# Patient Record
Sex: FEMALE | Race: BLACK OR AFRICAN AMERICAN | Employment: OTHER | URBAN - METROPOLITAN AREA
[De-identification: names, ages, dates, MRNs, and addresses within clinical notes are randomized per-mention and may not be internally consistent; named-entity substitution may affect disease eponyms.]

---

## 2017-06-26 ENCOUNTER — HOSPITAL ENCOUNTER (OUTPATIENT)
Dept: PHYSICAL THERAPY | Age: 52
Discharge: HOME | End: 2017-06-26
Payer: OTHER GOVERNMENT

## 2017-06-26 PROCEDURE — 97110 THERAPEUTIC EXERCISES: CPT

## 2017-06-26 PROCEDURE — 97161 PT EVAL LOW COMPLEX 20 MIN: CPT

## 2017-06-26 NOTE — PROGRESS NOTES
Phoebe Tre[address]izo [address]  [facility]  [facility]  Sree Mcguire [facility] [address], [address] [address] ,[address], [address]  Phone: [phone]  Fax: [phone] / [address] [address] PHYSICAL THERAPY SERVICES  Patient Name: Janice Mandujano : 1965   Medical   Diagnosis: Bilateral plantar fasciitis [M72.2]  Left tibialis posterior tendonitis [Z15.281] Treatment Diagnosis: L>R plantar fasciitis    Onset Date: May 2017     Referral Source: Gabriel Montaño DPM Start of Care [facility]): 2017   Prior Hospitalization: See medical history Provider #: [id number]   Prior Level of Function: Manageable sx with ADLs & fitness routines   Comorbidities: Previous h/o L ankle surgery, removal of bone sput   Medications: Verified on Patient Summary List   The Plan of Care and following information is based on the information from the initial evaluation.   ==================================================================================  Assessment / key information:  Patient is a 45 y.o. female who presents to [facility] with Dx of L>R plantar fasciitis. Patient reports initial onset of sx were chronic in nature with worsening of since this past May of 2017 when she increased activity levels when she started to exercise this past May. She was running, taking indoor cycling & weight training which she has since discontinued on 17 per recommendation of MD at last f/u. She is doing yoga 3 days/week. Patient reports sx are intermittent in nature with worsening of sx with increased activity levels/weightbearing. Sx improve with rest.  Average reported pain level at 3-4/10, 5/10 at worst & 0/10 at best. Upon objective evaluation patient demonstrates decreased gastroc flexibility with DF to neural bilaterally, WFL B ankle strength, no c/o pain upon of MMT of PF. Mild TTP along medial scar, along navicular.   Discussed & reviewed shoewear with patient, she is currently in an [employer] stability shoe, 10 mm heel toe offset, she was issued orthotics from MD but has not placed them in her shoes yet. Patient can benefit from PT interventions to improve strength, decrease pain, improve flexibility & balance to facilitate ADLs & overall functional status.  ===============================================================================  Eval Complexity: History MEDIUM  Complexity : 1-2 comorbidities / personal factors will impact the outcome/ POC ;  Examination  MEDIUM Complexity : 3 Standardized tests and measures addressing body structure, function, activity limitation and / or participation in recreation ; Presentation LOW Complexity : Stable, uncomplicated ;  Decision Making MEDIUM Complexity : FOTO score of 26-74; Overall Complexity LOW   Problem List: pain affecting function, decrease ROM, decrease strength, edema affecting function, impaired gait/ balance, decrease ADL/ functional abilitiies, decrease activity tolerance, decrease flexibility/ joint mobility and decrease transfer abilities   Treatment Plan may include any combination of the following: Therapeutic exercise, Therapeutic activities, Neuromuscular re-education, Physical agent/modality, Gait/balance training, Manual therapy, Aquatic therapy, Patient education, Self Care training, Functional mobility training, Home safety training, Stair training and Other: orthotic evaluation/fitting prn  Patient / Family readiness to learn indicated by: asking questions, trying to perform skills and interest  Persons(s) to be included in education: patient (P)  Barriers to Learning/Limitations: None  Measures taken:    Patient Goal (s): \"relieve some pain & continue to exercise without pain\"   Patient self reported health status: excellent  Rehabilitation Potential: excellent   Short Term Goals: To be accomplished in  2  weeks:  1) Establish HEP to prevent further disability.     2) Patient will report decreased c/o pain to < or = 2/10 to facilitate prolonged ambulation  with manageable sx in L ankle. [place] Halt 3) Patient will be able to participate in yoga classes 3xs/week with no c/o pain afterwards with use of daily ice, stretching, & postural modifications.  Long Term Goals: To be accomplished in  4  weeks:  1) Improve FOTO score from 64 points to > or = 78 points indicating improved tolerance with ADLs in regards to L ankle. 2) Patient to report 75% improvement in overall function in preparation for return to recreational activities with manageable sx in L ankle. 3) Patient to resume recreational fitness program 2-3xs/week with no increase in L ankle pain. 4) Patient to be independent & compliant with HEP in preparation for D/C. Frequency / Duration:   Patient to be seen  2-3  times per week for 4  weeks:  Patient / Caregiver education and instruction: self care, activity modification, brace/ splint application and exercises  G-Codes (GP): NA  Therapist Signature: SHAMIKA Velasquez cert MDT Date: 5/87/2816   Certification Period: NA Time: 9:11 AM   ===========================================================================================  I certify that the above Physical Therapy Services are being furnished while the patient is under my care. I agree with the treatment plan and certify that this therapy is necessary. Physician Signature:        Date:       Time:     Please sign and return to [facility] or you may fax the signed copy to [phone]. Thank you.

## 2017-06-26 NOTE — PROGRESS NOTES
PHYSICAL THERAPY - DAILY TREATMENT NOTE    Patient Name: Jaylon Harper        Date: 2017  : 1965   YES Patient  Verified  Visit #:      12  Insurance: Payor:  / Plan: Marifer Mccann AND DEPENDENTS / Product Type:  /      In time: 9:10 A Out time: 10 A   Total Treatment Time: 50     Medicare Time Tracking (below)   Total Timed Codes (min):  NA 1:1 Treatment Time:  NA     TREATMENT AREA =  L ankle    SUBJECTIVE  Pain Level (on 0 to 10 scale):  0  / 10   Medication Changes/New allergies or changes in medical history, any new surgeries or procedures? NO    If yes, update Summary List   Subjective Functional Status/Changes:  []  No changes reported     See POC           OBJECTIVE  Modalities Rationale:   PD   min [] Estim, type/location:                                      []  att     []  unatt     []  w/US     []  w/ice    []  w/heat    min []  Mechanical Traction: type/lbs                   []  pro   []  sup   []  int   []  cont    []  before manual    []  after manual    min []  Ultrasound, settings/location:      min []  Iontophoresis w/ dexamethasone, location:                                               []  take home patch       []  in clinic    min []  Ice     []  Heat    location/position:     min []  Vasopneumatic Device, press/temp:     min []  Other:    [] Skin assessment post-treatment (if applicable):    []  intact    []  redness- no adverse reaction     []redness  adverse reaction:        15 min Therapeutic Exercise:  [x]  See flow sheet   Rationale:      increase ROM and increase strength to improve the patients ability to return to pain-free sport      min Manual Therapy:    Rationale:          min Therapeutic Activity:    Rationale:         min Neuromuscular Re-ed:    Rationale:       min Gait Training:    Rationale:       min Patient Education:  YES  Reviewed HEP   []  Progressed/Changed HEP based on:         Other Objective/Functional Measures:    Reviewed modifications with current yoga practice, using towel/wedge to assist downward dog due to decreased gastroc flexibility     Post Treatment Pain Level (on 0 to 10) scale:   0  / 10     ASSESSMENT  Assessment/Changes in Function:     See POC     []  See Progress Note/Recertification   Patient will continue to benefit from skilled PT services to modify and progress therapeutic interventions, address functional mobility deficits, address ROM deficits, address strength deficits, assess and modify postural abnormalities, address imbalance/dizziness and instruct in home and community integration to attain remaining goals.    Progress toward goals / Updated goals:    Progressing towards goals established at [place] [facility]  [x]  Upgrade activities as tolerated YES Continue plan of care   []  Discharge due to :    []  Other:      Therapist: Yanely Brown PT    Date: 6/26/2017 Time: 10:33 AM     Future Appointments  Date Time Provider Bronwyn Lambert   6/29/2017 4:00 PM [facility]   7/6/2017 11:30 AM Yanely Brown PT [facility]   7/11/2017 4:00 PM Yanely Brown PT [facility]   7/13/2017 11:30 AM Yanely Brown PT [facility]   7/18/2017 9:30 AM Yanely Brown PT [facility]   7/20/2017 9:00 AM Yanely Brown PT [facility]   7/25/2017 9:30 AM Yanely Brown PT [facility]   7/27/2017 9:30 AM Yanely Brown, PT [facility]

## 2017-06-29 ENCOUNTER — APPOINTMENT (OUTPATIENT)
Dept: PHYSICAL THERAPY | Age: 52
End: 2017-06-29
Payer: OTHER GOVERNMENT

## 2017-06-30 ENCOUNTER — HOSPITAL ENCOUNTER (OUTPATIENT)
Dept: PHYSICAL THERAPY | Age: 52
Discharge: HOME | End: 2017-06-30
Payer: OTHER GOVERNMENT

## 2017-06-30 PROCEDURE — 97110 THERAPEUTIC EXERCISES: CPT

## 2017-06-30 NOTE — PROGRESS NOTES
PHYSICAL THERAPY - DAILY TREATMENT NOTE    Patient Name: Karla Ragland        Date: 2017  : 1965   YES Patient  Verified  Visit #:   2   of   12  Insurance: Payor:  / Plan: Marifer Mccann AND DEPENDENTS / Product Type:  /      In time: 7 A Out time: 7:45 A   Total Treatment Time: 39     Medicare Time Tracking (below)   Total Timed Codes (min):  NA 1:1 Treatment Time:  NA     TREATMENT AREA =  L ankle    SUBJECTIVE  Pain Level (on 0 to 10 scale):  0  / 10   Medication Changes/New allergies or changes in medical history, any new surgeries or procedures?     NO    If yes, update Summary List   Subjective Functional Status/Changes:  []  No changes reported     Patient reports no new complaints since initial eval.           OBJECTIVE  Modalities Rationale:   PD   min [] Estim, type/location:                                      []  att     []  unatt     []  w/US     []  w/ice    []  w/heat    min []  Mechanical Traction: type/lbs                   []  pro   []  sup   []  int   []  cont    []  before manual    []  after manual    min []  Ultrasound, settings/location:      min []  Iontophoresis w/ dexamethasone, location:                                               []  take home patch       []  in clinic    min []  Ice     []  Heat    location/position:     min []  Vasopneumatic Device, press/temp:     min []  Other:    [] Skin assessment post-treatment (if applicable):    []  intact    []  redness- no adverse reaction     []redness  adverse reaction:        45 min Therapeutic Exercise:  [x]  See flow sheet   Rationale:      increase ROM and increase strength to improve the patients ability to return to pain-free ambulation       min Manual Therapy:    Rationale:          min Therapeutic Activity:    Rationale:         min Neuromuscular Re-ed:    Rationale:       min Gait Training:    Rationale:       min Patient Education:  YES  Reviewed HEP   []  Progressed/Changed HEP based on:        Other Objective/Functional Measures: Added TE per flow sheet     Post Treatment Pain Level (on 0 to 10) scale:   0  / 10     ASSESSMENT  Assessment/Changes in Function:     Good tolerance to initial program today, no increase in pain      []  See Progress Note/Recertification   Patient will continue to benefit from skilled PT services to modify and progress therapeutic interventions, address functional mobility deficits, address ROM deficits, address strength deficits, address imbalance/dizziness and instruct in home and community integration to attain remaining goals.    Progress toward goals / Updated goals:    Progressing towards STG 2, 3     PLAN  [x]  Upgrade activities as tolerated YES Continue plan of care   []  Discharge due to :    []  Other:      Therapist: Arleen Tejada PT    Date: 6/30/2017 Time: 9:35 AM     Future Appointments  Date Time Provider Bronwyn Lambert   7/6/2017 11:30 AM Arleen Tejada, PT [facility]   7/11/2017 4:00 PM Arleen Tejada, PT [facility]   7/13/2017 11:30 AM Arleen Tejada PT [facility]   7/18/2017 9:30 AM Arleen Tejada PT [facility]   7/20/2017 9:00 AM Arleen Tejada PT [facility]   7/25/2017 9:30 AM Arleen Tejada, PT [facility]   7/27/2017 9:30 AM Viraj Rivero, PT [facility]

## 2017-07-06 ENCOUNTER — HOSPITAL ENCOUNTER (OUTPATIENT)
Dept: PHYSICAL THERAPY | Age: 52
Discharge: HOME | End: 2017-07-06
Payer: OTHER GOVERNMENT

## 2017-07-06 PROCEDURE — 97110 THERAPEUTIC EXERCISES: CPT

## 2017-07-06 NOTE — PROGRESS NOTES
PHYSICAL THERAPY - DAILY TREATMENT NOTE    Patient Name: Kvng Chauhan        Date: 2017  : 1965   YES Patient  Verified  Visit #:   3   of   12  Insurance: Payor:  / Plan: Marifer Mccann AND DEPENDENTS / Product Type:  /      In time: 11:30 A Out time: 12:15 P   Total Treatment Time: 39     Medicare Time Tracking (below)   Total Timed Codes (min):  NA 1:1 Treatment Time:  NA     TREATMENT AREA =  B feet    SUBJECTIVE  Pain Level (on 0 to 10 scale):  0  / 10   Medication Changes/New allergies or changes in medical history, any new surgeries or procedures? NO    If yes, update Summary List   Subjective Functional Status/Changes:  []  No changes reported     Patient reports no new complaints since last visit.            OBJECTIVE  Modalities Rationale:        min [] Estim, type/location:                                      []  att     []  unatt     []  w/US     []  w/ice    []  w/heat    min []  Mechanical Traction: type/lbs                   []  pro   []  sup   []  int   []  cont    []  before manual    []  after manual    min []  Ultrasound, settings/location:      min []  Iontophoresis w/ dexamethasone, location:                                               []  take home patch       []  in clinic    min []  Ice     []  Heat    location/position:     min []  Vasopneumatic Device, press/temp:     min []  Other:    [] Skin assessment post-treatment (if applicable):    []  intact    []  redness- no adverse reaction     []redness  adverse reaction:        45 min Therapeutic Exercise:  [x]  See flow sheet   Rationale:      increase ROM and increase strength to improve the patients ability to return to pain-free ambulation       min Manual Therapy:    Rationale:          min Therapeutic Activity:    Rationale:         min Neuromuscular Re-ed:    Rationale:       min Gait Training:    Rationale:       min Patient Education:  YES  Reviewed HEP   []  Progressed/Changed HEP based on:        Other Objective/Functional Measures: Added SLS, RB A/P weight shifting     Post Treatment Pain Level (on 0 to 10) scale:   0  / 10     ASSESSMENT  Assessment/Changes in Function:     Good tolerance to current program, no increase in pain after today's treatment      []  See Progress Note/Recertification   Patient will continue to benefit from skilled PT services to modify and progress therapeutic interventions, address functional mobility deficits, address ROM deficits, address strength deficits, address imbalance/dizziness and instruct in home and community integration to attain remaining goals.    Progress toward goals / Updated goals:    Progressing towards STG 2, 3     PLAN  [x]  Upgrade activities as tolerated YES Continue plan of care   []  Discharge due to :    []  Other:      Therapist: Arleen Tejada, PT    Date: 7/6/2017 Time: 6:45 PM     Future Appointments  Date Time Provider Bronwyn Lambert   7/11/2017 4:00 PM Tahir Sy [facility]   7/13/2017 11:30 AM Arleen Tejada, PT [facility]   7/18/2017 9:30 AM Arleen Tejada PT [facility]   7/20/2017 9:00 AM Arleen Tejada PT [facility]   7/25/2017 9:30 AM Arleen Tejada PT [facility]   7/27/2017 9:30 AM Arleen Tejada, PT [facility]

## 2017-07-11 ENCOUNTER — APPOINTMENT (OUTPATIENT)
Dept: PHYSICAL THERAPY | Age: 52
End: 2017-07-11
Payer: OTHER GOVERNMENT

## 2017-07-13 ENCOUNTER — HOSPITAL ENCOUNTER (OUTPATIENT)
Dept: PHYSICAL THERAPY | Age: 52
Discharge: HOME | End: 2017-07-13
Payer: OTHER GOVERNMENT

## 2017-07-13 PROCEDURE — 97110 THERAPEUTIC EXERCISES: CPT

## 2017-07-13 NOTE — PROGRESS NOTES
PHYSICAL THERAPY - DAILY TREATMENT NOTE    Patient Name: Andrews Espinoza        Date: 2017  : 1965   YES Patient  Verified  Visit #:      12  Insurance: Payor:  / Plan: Marifer Mccann AND DEPENDENTS / Product Type:  /      In time: 11:30 A Out time: 12:10 P   Total Treatment Time: 40     Medicare Time Tracking (below)   Total Timed Codes (min):  NA 1:1 Treatment Time:  NA     TREATMENT AREA =  B feet    SUBJECTIVE  Pain Level (on 0 to 10 scale):  0  / 10   Medication Changes/New allergies or changes in medical history, any new surgeries or procedures? NO    If yes, update Summary List   Subjective Functional Status/Changes:  []  No changes reported     Patient reports no new complaints since last treatment, she is not feeling well in regards to her stomach, but no pain in her feet at all.            OBJECTIVE  Modalities Rationale:     PD   min [] Estim, type/location:                                      []  att     []  unatt     []  w/US     []  w/ice    []  w/heat    min []  Mechanical Traction: type/lbs                   []  pro   []  sup   []  int   []  cont    []  before manual    []  after manual    min []  Ultrasound, settings/location:      min []  Iontophoresis w/ dexamethasone, location:                                               []  take home patch       []  in clinic    min []  Ice     []  Heat    location/position:     min []  Vasopneumatic Device, press/temp:     min []  Other:    [] Skin assessment post-treatment (if applicable):    []  intact    []  redness- no adverse reaction     []redness  adverse reaction:        40 min Therapeutic Exercise:  [x]  See flow sheet   Rationale:      increase ROM and increase strength to improve the patients ability to return to pain-free fitness program      min Manual Therapy:    Rationale:          min Therapeutic Activity:    Rationale:         min Neuromuscular Re-ed:    Rationale:       min Gait Training: Rationale:       min Patient Education:  YES  Reviewed HEP   []  Progressed/Changed HEP based on: Other Objective/Functional Measures: Added HR off step & eccentric lowering off step today on S/L L, SLS on green disc today    Post Treatment Pain Level (on 0 to 10) scale:   0  / 10     ASSESSMENT  Assessment/Changes in Function:     Good tolerance to strength progressions in standing     []  See Progress Note/Recertification   Patient will continue to benefit from skilled PT services to modify and progress therapeutic interventions, address functional mobility deficits, address ROM deficits, address strength deficits, analyze and modify body mechanics/ergonomics, address imbalance/dizziness and instruct in home and community integration to attain remaining goals.    Progress toward goals / Updated goals:    Progressing towards STG 2, 3     PLAN  [x]  Upgrade activities as tolerated YES Continue plan of care   []  Discharge due to :    []  Other:      Therapist: Suzette Dooley PT    Date: 7/13/2017 Time: 2:10 PM     Future Appointments  Date Time Provider Bronwyn Lambert   7/18/2017 9:30 AM Suzette Dooley PT [facility]   7/20/2017 9:00 AM Suzette Dooley PT [facility]   7/25/2017 9:30 AM Suzette Dooley PT [facility]   7/27/2017 9:30 AM Suzette Dooley PT [facility]

## 2017-07-18 ENCOUNTER — APPOINTMENT (OUTPATIENT)
Dept: PHYSICAL THERAPY | Age: 52
End: 2017-07-18
Payer: OTHER GOVERNMENT

## 2017-07-20 ENCOUNTER — APPOINTMENT (OUTPATIENT)
Dept: PHYSICAL THERAPY | Age: 52
End: 2017-07-20
Payer: OTHER GOVERNMENT

## 2017-07-25 ENCOUNTER — APPOINTMENT (OUTPATIENT)
Dept: PHYSICAL THERAPY | Age: 52
End: 2017-07-25
Payer: OTHER GOVERNMENT

## 2017-07-27 ENCOUNTER — APPOINTMENT (OUTPATIENT)
Dept: PHYSICAL THERAPY | Age: 52
End: 2017-07-27
Payer: OTHER GOVERNMENT

## 2017-12-18 NOTE — PROGRESS NOTES
[address]  [facility] AT [address] [place] [address]  Sree Mcguire [facility] [address], [address] ,[address], [address]  Phone: [phone]  Fax: [phone] SUMMARY  Patient Name: Jose Weinstein : 1965   Treatment/Medical Diagnosis: Plantar fasciitis, left [M72.2]   Referral Source: Michelle Lerma DPM     Date of Initial Visit: 17 Attended Visits: 4 Missed Visits: 1     SUMMARY OF TREATMENT  Therapeutic exercise including ROM, stretching, gentle strengthening, balance training, postural ed, patient education, HEP instruction, CP. CURRENT STATUS  Ms. Irineo Hernandez was last seen for PT on 17, she had reported no c/o pain in B feet. She called to cancel all of her appts on 17 due to a death in the family. No further contact has been made with clinic to continue with PT, therefore patient to be discharged to home program.    Goal/Measure of Progress Goal Met? 1.  Establish HEP to prevent further disability. Status at last Eval: NA Current Status: HEP established yes   2. Patient will report decreased c/o pain to < or = 2/10 to facilitate prolonged ambulation  with manageable sx in L ankle. .   Status at last Eval: 3-4/10 average  5/10 at worst Current Status: 0/10 at best yes   3. Patient will be able to participate in yoga classes 3xs/week with no c/o pain afterwards with use of daily ice, stretching, & postural modifications. Status at last Eval: unable Current Status: Unable to be re-assessed n/a     RECOMMENDATIONS  Other: self DC    If you have any questions/comments please contact us directly at [phone]. Thank you for allowing us to assist in the care of your patient.     Therapist Signature: SHAMIKA Wright, cert MDT Date: 77     Time: 10:47 AM